# Patient Record
Sex: MALE | Race: WHITE
[De-identification: names, ages, dates, MRNs, and addresses within clinical notes are randomized per-mention and may not be internally consistent; named-entity substitution may affect disease eponyms.]

---

## 2020-01-17 ENCOUNTER — HOSPITAL ENCOUNTER (OUTPATIENT)
Dept: HOSPITAL 35 - OR | Age: 70
Discharge: HOME | End: 2020-01-17
Attending: SURGERY
Payer: COMMERCIAL

## 2020-01-17 VITALS — HEIGHT: 72.99 IN | WEIGHT: 215 LBS | BODY MASS INDEX: 28.49 KG/M2

## 2020-01-17 VITALS — SYSTOLIC BLOOD PRESSURE: 140 MMHG | DIASTOLIC BLOOD PRESSURE: 76 MMHG

## 2020-01-17 DIAGNOSIS — D17.6: ICD-10-CM

## 2020-01-17 DIAGNOSIS — Z98.42: ICD-10-CM

## 2020-01-17 DIAGNOSIS — Z98.890: ICD-10-CM

## 2020-01-17 DIAGNOSIS — Z98.41: ICD-10-CM

## 2020-01-17 DIAGNOSIS — K42.0: Primary | ICD-10-CM

## 2020-01-17 DIAGNOSIS — Z79.899: ICD-10-CM

## 2020-01-17 DIAGNOSIS — Z79.891: ICD-10-CM

## 2020-01-17 DIAGNOSIS — K21.9: ICD-10-CM

## 2020-01-17 DIAGNOSIS — H40.9: ICD-10-CM

## 2020-01-17 DIAGNOSIS — E78.00: ICD-10-CM

## 2020-01-17 DIAGNOSIS — K40.90: ICD-10-CM

## 2020-01-17 PROCEDURE — 62850: CPT

## 2020-01-17 PROCEDURE — 50010 RENAL EXPLORATION: CPT

## 2020-01-17 PROCEDURE — 50417: CPT

## 2020-01-17 PROCEDURE — 70005: CPT

## 2020-01-17 PROCEDURE — 50386 REMOVE STENT VIA TRANSURETH: CPT

## 2020-01-17 PROCEDURE — 56525: CPT

## 2020-01-17 PROCEDURE — 50101: CPT

## 2020-01-17 PROCEDURE — 56526: CPT

## 2020-01-17 PROCEDURE — 54118: CPT

## 2020-01-17 PROCEDURE — 56524: CPT

## 2020-01-17 PROCEDURE — 54111 TREAT PENIS LESION GRAFT: CPT

## 2020-01-17 PROCEDURE — 56528: CPT

## 2020-01-17 PROCEDURE — 62110: CPT

## 2020-01-20 NOTE — PATH
[facility]
[address]
[place]   [address]                     PATHOLOGY RPT PROCEDURE       
_______________________________________________________________________________
 
Name:       DOUGGAMALIEL                Room #:                     DEP [facility] 
M.R.#:      [id number]     Account #:      [id number]  
Admission:  01/17/20    Date of Birth:  [birth year]  
Discharge:  01/17/20                                    Report #:    [id number]
                                                        Path Case #: [id number]
_______________________________________________________________________________
 
L[facility] Accession Number: [id number]
.                                                                01
Material submitted:                                        .
hernia - LEFT CORD LIPOMA. Modifiers: left
.                                                                01
Clinical history:                                          .
Umbilical hernia with obstruction, without gangrene, unilateral inguinal
hernia, without obstruction or gangrene not specified as recurrent.
.                                                                02
**********************************************************************
Diagnosis:
Mature adipose tissue, left cord lipoma, excision:
- Compatible with a lipoma showing foci of fat necrosis.
(IUV:zayda; 01/20/2020)
MBR  01/20/2020  1408 Local
**********************************************************************
.                                                                02
Electronically signed:                                     .
Jennifer Moseley MD, Pathologist
NPI- [id number]
.                                                                01
Gross description:                                         .
Received in formalin labeled "Gamaliel Mcclain, left cord lipoma" are two
irregular fragments of yellow-tan ragged soft tissue measuring 6.6 x 3.2 x
1.2 cm and 1.3 x 1.0 x 0.6 cm.  Upon sectioning, the cut surfaces are
yellow-tan and homogeneous.  Representative sections are submitted in
cassettes A1-A3. ([facility] [place]; 1/18/2020)
[facility]  01/18/2020  0914 Local
.                                                                02
Pathologist provided ICD-10:
D17.9
.                                                                02
CPT                                                        .
425541
Specimen Comment: A courtesy copy of this report has been sent to [phone]
[phone]
Specimen Comment: [phone]
Specimen Comment: Report sent to  and 
***Performed at:  01
   [address], [place]  [id number]
   MD Frank Aguirre MD Phone:  [phone]
***Performed at:  02
   [address]  [id number]
   MD Jennifer Moseley MD Phone:  [phone]

## 2020-01-22 NOTE — O
[facility]
[address]
[place]   [address]                     OPERATIVE REPORT              
_______________________________________________________________________________
 
Name:       NEELA CAMACHO                Room #:                     DEP [facility].#:      [id number]                       Account #:      [id number]  
Admission:  01/17/20    Attend Phys:    Augustin Diaz MD  
Discharge:  01/17/20    Date of Birth:  [birth year]  
                                                          Report #: [id number]
                                                                    [id number]   
_______________________________________________________________________________
THIS REPORT FOR:   //name//                          
 
CC: Dariusz Diaz
 
DATE OF SERVICE:  01/17/2020
 
 
PATIENT OF:  Dr. Augustin Diaz and Dr. Dariusz York.
 
PREOPERATIVE DIAGNOSES:  Incarcerated umbilical hernia and left inguinal hernia.
 
POSTOPERATIVE DIAGNOSES:  Incarcerated umbilical hernia and left inguinal hernia
with left cord lipoma.
 
PROCEDURE:  Left inguinal hernia repair with Prolene hernia system mesh and
excision of left cord lipoma and repair of an incarcerated umbilical hernia.
 
SURGEON:  Augustin Diaz MD
 
ANESTHESIA:  Local IV sedation.
 
DESCRIPTION OF PROCEDURE:  The patient was brought to the operating room and
placed on operative table in the supine position.  Sequential compression
devices were in place for DVT prophylaxis.  There was no indication for
preoperative antibiotics.  The patient underwent an IV sedation and the
umbilical and left inguinal area were prepped and draped in a sterile fashion. 
Skin and subcutaneous tissue overlying the left inguinal area was infiltrated
with 0.5% Marcaine and 1% Xylocaine in a 1:1 mixture.  Left inguinal skin
incision was then performed using #10 scalpel blade.  Hemostasis obtained using
electrocautery.  Dissection was carried down through the subcutaneous tissue and
the external oblique fascia, which was then incised with a knife and opened with
the Metzenbaum scissors.  The ilioinguinal nerve was identified, dissected free,
injected with the local mixture and preserved.  Cord was then elevated and held
into place with Penrose drain.  Cremasteric muscle fibers were then split in the
direction of their fibers using clamp and electrocautery.  A cord lipoma was
identified, dissected free, clamped, excised and tied with a 2-0 chromic tie and
sent as specimen to pathology.  The floor was inspected and found to be weakened
representing a small to moderate size direct inguinal hernia defect.  The direct
inguinal hernia sac was incised and opened with electrocautery.  An extended
Prolene hernia system mesh was then inserted through the floor and then underlay
patch was then deployed in the preperitoneal space.  The connector was left in
the floor and the floor was then tightened around the connector using running
2-0 Prolene two layer Shouldice repair.  The overlay patch was then deployed
into the inguinal canal and the mesh was secured at the pubic tubercle with the
 
 
 
[address]   [address]                     OPERATIVE REPORT              
_______________________________________________________________________________
 
Name:       NEELA CAMACHO                Room #:                     DEP [facility].#:      [id number]                       Account #:      [id number]  
Admission:  01/17/20    Attend Phys:    Augustin Diaz MD  
Discharge:  01/17/20    Date of Birth:  [birth year]  
                                                          Report #: [id number]
                                                                    [id number]   
_______________________________________________________________________________
same running 2-0 Prolene suture.  It was then secured superiorly and at the
connector using simple interrupted 2-0 Vicryl sutures.  The mesh was split and
wrapped around the cord, secured to the inguinal ligament with simple
interrupted 2-0 Vicryl suture.  The cord and ilioinguinal nerve then returned to
the canal intact.  The external oblique fascia was then closed using running 2-0
Vicryl suture.  Chuck's fascia was then reapproximated using 3 simple
interrupted 2-0 chromic sutures and the skin then closed with a running 4-0
subcuticular Vicryl stitch.  Attention was then turned to the incarcerated
umbilical hernia.  The skin and subcutaneous tissue around the umbilicus was
infiltrated with 0.5% Marcaine and 1% Xylocaine in a 1:1 mixture.  A transverse
infraumbilical skin incision was then performed using a #15 scalpel blade. 
Hemostasis obtained using electrocautery.  Dissection was carried down through
the subcutaneous tissue to some incarcerated umbilical hernia sac and
preperitoneal fat.  This was dissected free from the umbilical skin and reduced
back through the fascial defect.  There was a vessel of bleeding in the
preperitoneal space and this was controlled with the electrocautery.  The
fascial defect was then closed using interrupted figure-of-eight 0 Prolene
sutures.  The umbilicus was then tacked to the fascia with simple interrupted
2-0 chromic sutures.  Deep and superficial subcutaneous tissue was then
reapproximated using simple interrupted 2-0 chromic sutures and the skin then
closed with a running 4-0 subcuticular Vicryl stitch.  The umbilical incision
was dressed with Dermabond, Telfa, 4 x 4 gauze, sponge and tape.  The inguinal
hernia repair was dressed with Mastisol, 1/2-inch Steri-Strips cut in half,
Telfa, 4 x 4 gauze, sponge and tape.  The patient was then taken to recovery
room awake, alert and in good condition.  Estimated blood loss was approximately
20 mL total.  The patient tolerated procedure well.  All sponge, lap and
instrument counts correct x 2.
 
 
 
 
 
 
 
 
 
 
 
 
 
 
 
 
 
  <ELECTRONICALLY SIGNED>
   By: Augustin Diaz MD          
  01/22/20     1108
D: 01/17/20 1528                           _____________________________________
T: 01/17/20 1544                           Augustin Diaz MD            /nt